# Patient Record
(demographics unavailable — no encounter records)

---

## 2024-12-18 NOTE — ED.PDOC
Eye-HPI


HPI Comments


72 year old female presents to ER with right eye complaint x 2 days. Patient 

states she has been experiencing right eye pain and redness to right eye 2 days 

ago. Denies any trauma/injury. She reports 7/10 burning pain to right eye. 

Denies use of medications for current symptoms. Patient presents to ER 

ambulatory on arrival with steady gait, in no distress and states she did have a

right corneal transplant 3 years ago. Denies skin changes, vision changes, n/v, 

eye drainage, headache, numbness/tingling or any further symptoms/complaints


Chief Complaint:  Eye Problem


Time Seen by MD:  18:09


Primary Care Provider:  DARIN


Reviewed Notes:  Nurses Notes, Medications, Allergies


Allergies:  


Coded Allergies:  


     NO KNOWN ALLERGIES (Unverified , 23)


Home Meds


Active Scripts


Gentamicin Sulfate (Gentamicin Sulfate) 0.3 % Sol, 2 DROP RIGHTEYE 6XD for 5 

Days, #1 BOTTLE 0 Refills


   Prov:MARCELLA SANTANA         24


Information Source:  Patient


Mode of Arrival:  Ambulatory





Past Medical History


PAST MEDICAL HISTORY:  Arthritis (RA), High Lipids, HTN


Surgical History:  , Hysterectomy


Surgical History (Other):  


Right corneal transplant- 





Family History


Family History:  Unknown





Social History


Smoker:  Non-Smoker


Alcohol:  Denies ETOH Use


Drugs:  Denies Drug Use


Lives In:  Home





Constitutional:  denies: chills, diaphoresis, fatigue, fever, malaise, sweats, 

weakness, others


EENTM:  reports: others (As stated in HPI)


Respiratory:  denies: cough, hemoptysis, orthopnea, SOB at rest, shortness of 

breath, SOB with excertion, stridor, wheezing, others


Cardiovascular:  denies: chest pain, dizzy spells, diaphoresis, Dyspnea on 

exertion, edema, irregular heart beat, left arm pain, lightheadedness, 

palpitations, PND, syncope, others


Gastrointestinal:  denies: abdomen distended, abdominal pain, blood streaked 

bowels, constipated, diarrhea, dysphagia, difficulty swallowing, hematemesis, 

melena, nausea, poor appetite, poor fluid intake, rectal bleeding, rectal pain, 

vomiting, others


Genitourinary:  denies: abnormal vagina bleeding, burning, dyspareunia, dysuria,

flank pain, frequency, hematuria, incontinence, pain, pregnant, vagina 

discharge, urgency, others


Neurological:  denies: dizziness, fainting, headache, left sided numbness, left 

sided weakness, numbness, paresthesia, pre-existing deficit, right sided 

numbness, right sided weakness, seizure, speech problems, tingling, tremors, 

weakness, others


Musculoskeletal:  denies: back pain, gout, joint pain, joint swelling, muscle 

pain, muscle stiffness, neck pain, others


Integumetry:  denies: bruises, change in color, change in hair/nails, dryness, 

laceration, lesions, lumps, rash, wounds, others


Allergic/Immunocompromised:  denies: Difficulty Healing, Frequent Infections, 

Hives, Itching, others


Hematologic/Lymphatic:  denies: anemia, blood clots, easy bleeding, easy 

bruising, swollen glands, others


Endocrine:  denies: excessive hunger, excessive sweating, excessive thirst, 

excessive urination, flushing, intolerance to cold, intolerance to heat, 

unexplained weight gain, unexplained weight loss, others


Psychiatric:  denies: anxiety, bipolar disorder, depression, hopeless, panic 

disorder, schizophrenia, sleepless, suicidal, others





Physical Exam


General Appearance:  No Apparent Distress, Obese


HEENT:  PERRL/EOMI, Pharynx Normal, TMs Normal, Other ([place] examination 

right eye- subconjunctival injection and corneal abrasion noted.  No foreign 

body or corneal ulcer noted. No drainage from right eye noted. Visual acuity 

right eye- 20/40, visual acuity left eye-20/40, visual acuity using both eyes - 

20/40)


Neck:  Full Range of Motion, Non-Tender, Normal


Respiratory:  Chest Non-Tender, Lungs Clear, No Accessory Muscle Use, No 

Respiratory Distress, Normal Breath Sounds


Cardiovascular:  No Murmur, No Gallop, Regular Rate/Rhythm


Breast Exam:  Deferred


Gastrointestinal:  NOT DONE


Genitalia:  Deferred


Pelvic:  Deferred


Rectal:  Deferred


Extremities:  Normal capillary refill, Normal range of motion


Neurologic:  Alert, CNs II-XII nml as Tested, No Motor Deficits, Normal Affect, 

Normal Mood, No Sensory Deficits


Cerebellar Function:  Normal


Reflexes:  Normal


Skin:  Dry, Normal Color, Warm


Lymphatic:  No Adenopathy





Was a procedure done?


Was a procedure done?:  No





Sedation


Sedation?:  No





EENT DIFF


Eye:  Corneal Lacerations, Corneal Ulceration, Globe Rupture, Hordeolum (stye), 

Orbital Cellulits, Periorbital Cellulits





X-Ray, Labs, Meds, VS





                                   Vital Signs








  Date Time  Temp Pulse Resp B/P (MAP) Pulse Ox O2 Delivery O2 Flow Rate FiO2


 


24 19:38     100 Room Air 0 


 


24 18:16 97.9 73 18 128/56 (80) 100   





Fluorescein stain ophthalmic ordered 


Tetracaine ophthalmic ordered 


Patient in no distress during ER visit/prior to discharge 


Advised to follow up with PCP and ophthalmologist in 1-2 days 


Patient verbalized understanding and agreeable with current plan of care 


Advised to return to ER immediately if symptoms worsen


Time of 1ST Reevaluation:  20:02


Reevaluation 1ST:  N/A


Patient Education/Counseling:  Diagnosis, Treatment, Prognosis, Need For Follow 

Up


Family Education/Counseling:  No Family Present





Departure 1


Departure


Time of Disposition:  20:22


Impression:  


   Primary Impression:  


   Corneal abrasion


   Qualified Codes:  S05.01XA - Injury of conjunctiva and corneal abrasion 

   without foreign body, right eye, initial encounter


Disposition:   HOME / SELF CARE / HOMELESS


Condition:  Stable


e-Prescriptions


Gentamicin Sulfate (Gentamicin Sulfate) 0.3 % Sol


2 DROP RIGHTEYE 6XD for 5 Days, #1 BOTTLE 0 Refills


   Prov: MARCELLA SANTANA         24


Discharged With:  Friend





Critical Care Note


Critical Care Time?:  No





Stability


Stability form required:  No





Heart Score


Heart Score:  








Heart Score Response (Comments) Value


 


History N/A 0


 


EKG N/A 0


 


Age N/A 0


 


Risk Factors N/A 0


 


Troponin N/A 0


 


Total  0

















MARCELLA SANTANA               Dec 18, 2024 20:06